# Patient Record
Sex: MALE | Race: WHITE
[De-identification: names, ages, dates, MRNs, and addresses within clinical notes are randomized per-mention and may not be internally consistent; named-entity substitution may affect disease eponyms.]

---

## 2020-05-06 ENCOUNTER — HOSPITAL ENCOUNTER (OUTPATIENT)
Dept: HOSPITAL 47 - LABWHC1 | Age: 50
Discharge: HOME | End: 2020-05-06
Attending: FAMILY MEDICINE
Payer: COMMERCIAL

## 2020-05-06 DIAGNOSIS — Z03.818: Primary | ICD-10-CM

## 2020-05-06 PROCEDURE — 87635 SARS-COV-2 COVID-19 AMP PRB: CPT

## 2021-03-10 ENCOUNTER — HOSPITAL ENCOUNTER (OUTPATIENT)
Dept: HOSPITAL 47 - RADXRMAIN | Age: 51
Discharge: HOME | End: 2021-03-10
Attending: NURSE PRACTITIONER
Payer: COMMERCIAL

## 2021-03-10 DIAGNOSIS — M48.02: Primary | ICD-10-CM

## 2021-03-10 DIAGNOSIS — M25.811: ICD-10-CM

## 2021-03-10 DIAGNOSIS — M48.061: ICD-10-CM

## 2021-03-10 DIAGNOSIS — M53.86: ICD-10-CM

## 2021-03-10 DIAGNOSIS — M41.84: ICD-10-CM

## 2021-03-10 DIAGNOSIS — M43.12: ICD-10-CM

## 2021-03-10 DIAGNOSIS — M25.842: ICD-10-CM

## 2021-03-10 DIAGNOSIS — M48.04: ICD-10-CM

## 2021-03-10 PROCEDURE — 72110 X-RAY EXAM L-2 SPINE 4/>VWS: CPT

## 2021-03-10 PROCEDURE — 72040 X-RAY EXAM NECK SPINE 2-3 VW: CPT

## 2021-03-10 PROCEDURE — 72072 X-RAY EXAM THORAC SPINE 3VWS: CPT

## 2021-03-10 NOTE — XR
EXAMINATION TYPE: XR hand complete LT

 

DATE OF EXAM: 3/10/2021

 

CLINICAL HISTORY: Chronic pain.

 

TECHNIQUE:  Frontal, lateral and oblique images of the left hand are obtained.

 

COMPARISON: None.

 

FINDINGS:  There is no acute fracture/dislocation evident in the left hand. Mild-to-moderate narrowin
g throughout the PIP and DIP joints of the phalanges.  No significant spurring. Sparing of the MCP jc
ints. The overlying soft tissue appears unremarkable.

 

IMPRESSION: As above.

## 2021-03-10 NOTE — XR
EXAMINATION TYPE: XR thoracic spine complete

 

DATE OF EXAM: 3/10/2021

 

CLINICAL HISTORY: Chronic pain.

 

TECHNIQUE: Frontal, lateral, and swimmer's view of thoracic spine are obtained.  

 

COMPARISON: None.

 

FINDINGS: Thoracic spine show slight scoliotic curvature upper thoracic levels without evidence of ac
Douglas fracture or dislocation.  Vertebral body heights are preserved. Mild multilevel disc space narrow
ing of the mid thoracic levels. Mild multilevel anterior and lateral spurring. Visualized ribs are in
tact bilaterally.   

 

IMPRESSION: As above.

## 2021-03-10 NOTE — XR
EXAMINATION TYPE: XR cervical spine limited

 

DATE OF EXAM: 3/10/2021

 

TECHNIQUE: Frontal, lateral,  and open mouth view of the cervical spine are obtained.

 

HISTORY:  M54.9 M79.642

 

COMPARISON: None

 

FINDINGS:  The cervical spine is visualized in its entirety from C1 thru the top of T1 level, there i
s grade 1 retrolisthesis C5 on C6. Mild to moderate disc space narrowing with moderate anterior spurr
ing at this level is present. Mild to moderate spurring C6-C7 level. Vertebral body heights and disc 
space heights are maintained. C1-C2 articulation satisfactory on the open mouth frontal view. Prevert
ebral soft tissue appears within normal limits. Overlying Soft tissue is unremarkable.

 

IMPRESSION: As above.

## 2021-03-10 NOTE — XR
EXAMINATION TYPE: XR lumbosacral spine min 4V

 

DATE OF EXAM: 3/10/2021

 

CLINICAL HISTORY: Chronic pain.

 

TECHNIQUE: Frontal, lateral, and oblique images of the lumbar spine are obtained.

 

COMPARISON: None

 

FINDINGS:  There are 5 lumbar type vertebral bodies identified.  The lumbar spine shows straightened 
alignment without evidence of acute fracture or dislocation. Vertebral body heights are within normal
 limits.  Mild to moderate disc space narrowing and spurring L2-L3 level. Mild disc space narrowing w
ith mild to moderate anterior spurring L3-L4 level. Mild disc space narrowing L4-L5 level. Moderate b
ilateral spurring L3-L4 level. The oblique images appear within normal limits.  The overlying soft ti
ssue appears unremarkable.

 

IMPRESSION: As above.

## 2021-03-10 NOTE — XR
EXAMINATION TYPE: XR shoulder complete RT

 

DATE OF EXAM: 3/10/2021

 

CLINICAL HISTORY: Chronic pain.

 

TECHNIQUE:  Three views of the right shoulder are obtained.

 

COMPARISON: None. 

 

FINDINGS:  There is no acute fracture/dislocation evident in the right shoulder. Mild to moderate don
rowing greatest inferiorly at the acromioclavicular joint. Mild to moderate narrowing glenohumeral jc
int. The visualized ribs are intact .

 

IMPRESSION: As above.

## 2021-08-17 ENCOUNTER — HOSPITAL ENCOUNTER (OUTPATIENT)
Dept: HOSPITAL 47 - RADXRMAIN | Age: 51
Discharge: HOME | End: 2021-08-17
Attending: NURSE PRACTITIONER
Payer: COMMERCIAL

## 2021-08-17 DIAGNOSIS — M12.9: Primary | ICD-10-CM

## 2021-08-17 PROCEDURE — 73521 X-RAY EXAM HIPS BI 2 VIEWS: CPT

## 2021-08-17 NOTE — XR
EXAMINATION TYPE: XR Hip Bilateral and AP pelvis

 

DATE OF EXAM: 8/17/2021

 

COMPARISON: NONE

 

HISTORY: Pain

 

TECHNIQUE: A single AP view of the pelvis is obtained. Two views of the bilateral hip are obtained.  


 

FINDINGS:  There is no acute fracture/dislocation evident in the pelvis. Mild sclerosis and narrowing
 of the left SI joint..

 

Mild concentric narrowing the hip joints bilaterally. Abnormal morphology of the right femoral head c
an be associated with femoral acetabular impingement. Mild osteitis pubis condensans noted. Vascular 
calcifications in the pelvis. Calcific density adjacent to the lateral margin of the left femur can b
e associated with chronic acetabular labral tear.

 

IMPRESSION:

1. Mild-to-moderate bilateral arthropathy.

2. Correlate for femoral acetabular impingement.

3. 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

4. Left SI joint arthropathy.

## 2021-09-17 ENCOUNTER — HOSPITAL ENCOUNTER (OUTPATIENT)
Dept: HOSPITAL 47 - RADMRIMAIN | Age: 51
Discharge: HOME | End: 2021-09-17
Payer: COMMERCIAL

## 2021-09-17 DIAGNOSIS — M99.73: ICD-10-CM

## 2021-09-17 DIAGNOSIS — M51.17: Primary | ICD-10-CM

## 2021-09-17 DIAGNOSIS — M89.38: ICD-10-CM

## 2021-09-17 PROCEDURE — 72148 MRI LUMBAR SPINE W/O DYE: CPT

## 2021-09-17 NOTE — MR
EXAMINATION TYPE: MR lumbar spine wo con

 

DATE OF EXAM: 9/17/2021

 

COMPARISON: X-rays 3/10/2021

 

HISTORY: Low back pain into rt buttocks, hip pain

 

TECHNIQUE: T1 and T2  axial and sagittal images of the lumbar spine are submitted.  

 

FINDINGS: There is no abnormal signal seen within the visualized spinal cord or paraspinal soft tissu
es. There is multilevel degenerative disc disease and hypertrophic spurring. Loss of the normal lumba
r lordosis.

 

At L1-2 there is no disc herniation or canal stenosis. No foraminal encroachment.

 

At L2-3 there is degenerative disc disease with discogenic marrow changes. No disc herniation or gray
l stenosis. No foraminal encroachment. Mild circumferential disc bulging.

 

At L3-4 there is circumferential disc bulging with hypertrophic change of the facets. No canal stenos
is or disc herniation. Mild bilateral foraminal encroachment.

 

At L4-5 there is broad-based disc bulging but no focal herniation or canal stenosis. Hypertrophic heriberto
nge of the facets and ligamentum flavum. Mild bilateral foraminal encroachment.

 

At L5-S1 there is degenerative disc disease with broad-based disc bulging greater paracentrally to le
ft. Mild right foraminal and mild to moderate left foraminal encroachment. Discogenic marrow changes 
are noted.

 

 

IMPRESSION:

1. Multilevel hypertrophic and degenerative change with multilevel disc bulging and foraminal encroac
hment as discussed above. No canal stenosis or focal herniation.

## 2021-10-21 ENCOUNTER — HOSPITAL ENCOUNTER (OUTPATIENT)
Dept: HOSPITAL 47 - RADXRMAIN | Age: 51
Discharge: HOME | End: 2021-10-21
Attending: NURSE PRACTITIONER
Payer: COMMERCIAL

## 2021-10-21 DIAGNOSIS — R06.02: Primary | ICD-10-CM

## 2021-10-21 LAB
ALBUMIN SERPL-MCNC: 4.5 G/DL (ref 3.5–5)
ALP SERPL-CCNC: 65 U/L (ref 38–126)
ALT SERPL-CCNC: 47 U/L (ref 4–49)
ANION GAP SERPL CALC-SCNC: 15 MMOL/L
AST SERPL-CCNC: 61 U/L (ref 17–59)
BASOPHILS # BLD AUTO: 0 K/UL (ref 0–0.2)
BASOPHILS NFR BLD AUTO: 0 %
BUN SERPL-SCNC: 16 MG/DL (ref 9–20)
CALCIUM SPEC-MCNC: 9.4 MG/DL (ref 8.4–10.2)
CHLORIDE SERPL-SCNC: 97 MMOL/L (ref 98–107)
CO2 SERPL-SCNC: 24 MMOL/L (ref 22–30)
EOSINOPHIL # BLD AUTO: 0 K/UL (ref 0–0.7)
EOSINOPHIL NFR BLD AUTO: 0 %
ERYTHROCYTE [DISTWIDTH] IN BLOOD BY AUTOMATED COUNT: 4.48 M/UL (ref 4.3–5.9)
ERYTHROCYTE [DISTWIDTH] IN BLOOD: 14.2 % (ref 11.5–15.5)
GLUCOSE SERPL-MCNC: 118 MG/DL (ref 74–99)
HCT VFR BLD AUTO: 42.2 % (ref 39–53)
HGB BLD-MCNC: 15.7 GM/DL (ref 13–17.5)
LDH SPEC-CCNC: 775 U/L (ref 313–618)
LYMPHOCYTES # SPEC AUTO: 0.6 K/UL (ref 1–4.8)
LYMPHOCYTES NFR SPEC AUTO: 16 %
MCH RBC QN AUTO: 35.1 PG (ref 25–35)
MCHC RBC AUTO-ENTMCNC: 37.3 G/DL (ref 31–37)
MCV RBC AUTO: 94.1 FL (ref 80–100)
MONOCYTES # BLD AUTO: 0.2 K/UL (ref 0–1)
MONOCYTES NFR BLD AUTO: 7 %
NEUTROPHILS # BLD AUTO: 2.5 K/UL (ref 1.3–7.7)
NEUTROPHILS NFR BLD AUTO: 75 %
PLATELET # BLD AUTO: 100 K/UL (ref 150–450)
POTASSIUM SERPL-SCNC: 3.7 MMOL/L (ref 3.5–5.1)
PROT SERPL-MCNC: 8 G/DL (ref 6.3–8.2)
SODIUM SERPL-SCNC: 136 MMOL/L (ref 137–145)
WBC # BLD AUTO: 3.4 K/UL (ref 3.8–10.6)

## 2021-10-21 PROCEDURE — 82728 ASSAY OF FERRITIN: CPT

## 2021-10-21 PROCEDURE — 85652 RBC SED RATE AUTOMATED: CPT

## 2021-10-21 PROCEDURE — 71046 X-RAY EXAM CHEST 2 VIEWS: CPT

## 2021-10-21 PROCEDURE — 80053 COMPREHEN METABOLIC PANEL: CPT

## 2021-10-21 PROCEDURE — 83615 LACTATE (LD) (LDH) ENZYME: CPT

## 2021-10-21 PROCEDURE — 85025 COMPLETE CBC W/AUTO DIFF WBC: CPT

## 2021-10-21 PROCEDURE — 84145 PROCALCITONIN (PCT): CPT

## 2021-10-21 NOTE — XR
EXAMINATION TYPE: XR chest 2V

 

DATE OF EXAM: 10/21/2021

 

COMPARISON: NONE

 

HISTORY: Shortness of breath

 

TECHNIQUE:  Frontal and lateral views of the chest are obtained.

 

FINDINGS:

 

Scattered senescent parenchymal changes noted. Hyperinflation compatible with COPD. 

 

No evidence for infiltrate. No evidence for atelectasis.

 

Heart size is stable.

 

Mediastinal structures are stable and grossly unremarkable.

 

No evidence for hilar prominence.

 

Degenerative changes dorsal spine. 

 

IMPRESSION:

1. No evidence for acute pulmonary disease.

## 2021-10-22 LAB — FERRITIN SERPL-MCNC: 1944 NG/ML (ref 22–322)

## 2021-11-04 ENCOUNTER — HOSPITAL ENCOUNTER (OUTPATIENT)
Dept: HOSPITAL 47 - LABWHC1 | Age: 51
Discharge: HOME | End: 2021-11-04
Payer: COMMERCIAL

## 2021-11-04 DIAGNOSIS — J06.9: ICD-10-CM

## 2021-11-04 DIAGNOSIS — U07.1: Primary | ICD-10-CM

## 2021-11-04 PROCEDURE — 87502 INFLUENZA DNA AMP PROBE: CPT
